# Patient Record
Sex: FEMALE | Race: WHITE
[De-identification: names, ages, dates, MRNs, and addresses within clinical notes are randomized per-mention and may not be internally consistent; named-entity substitution may affect disease eponyms.]

---

## 2022-09-29 ENCOUNTER — HOSPITAL ENCOUNTER (EMERGENCY)
Dept: HOSPITAL 5 - ED | Age: 82
Discharge: NURSING, REHAB OR LONG-TERM CARE | End: 2022-09-29
Payer: MEDICARE

## 2022-09-29 VITALS — DIASTOLIC BLOOD PRESSURE: 75 MMHG | SYSTOLIC BLOOD PRESSURE: 151 MMHG
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
## 2022-09-29 DIAGNOSIS — S72.92XA: Primary | ICD-10-CM
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
## 2022-09-29 LAB
ALBUMIN SERPL-MCNC: 4.1 G/DL (ref 3.9–5)
ALT SERPL-CCNC: 24 UNITS/L (ref 7–56)
BASOPHILS # (AUTO): 0 K/MM3 (ref 0–0.1)
BASOPHILS NFR BLD AUTO: 0.4 % (ref 0–1.8)
BUN SERPL-MCNC: 17 MG/DL (ref 7–17)
BUN/CREAT SERPL: 28 %
CALCIUM SERPL-MCNC: 9.4 MG/DL (ref 8.4–10.2)
EOSINOPHIL # BLD AUTO: 0.1 K/MM3 (ref 0–0.4)
EOSINOPHIL NFR BLD AUTO: 0.8 % (ref 0–4.3)
HCT VFR BLD CALC: 37.8 % (ref 30.3–42.9)
HEMOLYSIS INDEX: 5
HGB BLD-MCNC: 12.2 GM/DL (ref 10.1–14.3)
LYMPHOCYTES # BLD AUTO: 2.8 K/MM3 (ref 1.2–5.4)
LYMPHOCYTES NFR BLD AUTO: 37 % (ref 13.4–35)
MCHC RBC AUTO-ENTMCNC: 32 % (ref 30–34)
MCV RBC AUTO: 94 FL (ref 79–97)
MONOCYTES # (AUTO): 0.6 K/MM3 (ref 0–0.8)
MONOCYTES % (AUTO): 8.4 % (ref 0–7.3)
PLATELET # BLD: 228 K/MM3 (ref 140–440)
RBC # BLD AUTO: 4.02 M/MM3 (ref 3.65–5.03)

## 2022-09-29 PROCEDURE — 99285 EMERGENCY DEPT VISIT HI MDM: CPT

## 2022-09-29 PROCEDURE — 85025 COMPLETE CBC W/AUTO DIFF WBC: CPT

## 2022-09-29 PROCEDURE — 72125 CT NECK SPINE W/O DYE: CPT

## 2022-09-29 PROCEDURE — 71045 X-RAY EXAM CHEST 1 VIEW: CPT

## 2022-09-29 PROCEDURE — 80053 COMPREHEN METABOLIC PANEL: CPT

## 2022-09-29 PROCEDURE — 36415 COLL VENOUS BLD VENIPUNCTURE: CPT

## 2022-09-29 PROCEDURE — 72192 CT PELVIS W/O DYE: CPT

## 2022-09-29 PROCEDURE — 70450 CT HEAD/BRAIN W/O DYE: CPT

## 2022-09-29 NOTE — XRAY REPORT
CHEST 1 VIEW 9/29/2022 12:04 PM



INDICATION / CLINICAL INFORMATION: fall.



COMPARISON: 9/8/2016



FINDINGS:



SUPPORT DEVICES: None.

HEART / MEDIASTINUM: No significant abnormality. 

LUNGS / PLEURA: No significant pulmonary or pleural abnormality. No pneumothorax. 



ADDITIONAL FINDINGS: No significant additional findings.



IMPRESSION:

1. No acute findings.



Signer Name: Johan Nash MD 

Signed: 9/29/2022 1:07 PM

Workstation Name: [id number]

## 2022-09-29 NOTE — CAT SCAN REPORT
CT CERVICAL SPINE SPINE WITHOUT CONTRAST



INDICATION:  Fall, injury. 



TECHNIQUE:  Axial imaging performed through the cervical spine without the use of contrast.  Sagittal
 and coronal reconstructed images were also reviewed.  All CT scans at this location are performed us
ing CT dose reduction for ALARA by means of automated exposure control. 



COMPARISON:  None



FINDINGS: 



Alignment:  There is straightening of the normal cervical lordosis. No evidence for subluxation.  

Bones:  There is no acute osseous abnormality.  Mild to moderate discogenic DJD and facet arthropathy
 are identified at all levels.  No central canal stenosis or high-grade neural foraminal narrowing is
 detected. 

Soft tissues:  No acute or significant incidental soft tissue abnormality.



IMPRESSION:  No evidence for acute injury of the cervical spine. Multilevel degenerative changes thro
ughout the cervical spine.



Signer Name: Emanuel Yee Jr, MD 

Signed: 9/29/2022 12:52 PM

Workstation Name: [id number]

## 2022-09-29 NOTE — CAT SCAN REPORT
CT pelvis wo con 



INDICATION / CLINICAL INFORMATION:

Suspected femur fracture.



TECHNIQUE:

CT images of the pelvis without contrast All CT scans at this location are performed using CT dose re
duction for ALARA by means of automated exposure control.



COMPARISON:

None available.



FINDINGS:



There is an acute impacted subcapital left femoral neck fracture with mild displacement. No hip dislo
cation. Additionally, there is an acute fracture extending through the S3 vertebral body and posterio
r elements. No significant displacement. No additional fracture. Mild-moderate hip osteoarthritis wit
hout joint effusion.



Uterus is absent. Moderate stool distends the rectal vault. No acute findings in the pelvis.



IMPRESSION:

1. Acute impacted subcapital left femoral neck fracture.

2. Acute fracture of the S3 vertebral body and extending to involve the posterior elements without si
gnificant displacement



Signer Name: Chirag Urban MD 

Signed: 9/29/2022 12:58 PM

Workstation Name: [id number]

## 2022-09-29 NOTE — EMERGENCY DEPARTMENT REPORT
ED Fall HPI





- General


Chief Complaint: Extremity Injury, Lower


Stated Complaint: LT HIP PAIN x2DAYS


Time Seen by Provider: 09/29/22 11:08


Source: EMS


Mode of arrival: Stretcher





- History of Present Illness


Initial Comments: 





Patient is an 82-year-old female with history of dementia who presents to the 

emergency department with questional femur fracture.  Patient presents from [facility] with a report that since that she has a questionable femur fracture and 

that a repeat x-ray or CT scan is recommended.  Patient has no complaints at 

this time.





- Related Data


                                Home Medications











 Medication  Instructions  Recorded  Confirmed  Last Taken


 


Acetaminophen [Tylenol] 650 mg PO Q6HR PRN 09/08/16 09/29/22 Unknown


 


Antacid [Alum-Mag Hydrox-Simeth 10 ml PO Q4HR 09/08/16 09/29/22 Unknown





462-100-62Qp/5Ml Susp]    


 


Calcium Carbonate/Vitamin D3 1 each PO QDAY 09/08/16 09/29/22 Unknown





[Calcium 600-Vit D3 400 Tablet]    


 


Clopidogrel [Plavix] 75 mg PO QDAY 09/08/16 09/29/22 Unknown


 


Docusate Sodium [Colace] 100 mg PO 1XW PRN 09/08/16 09/29/22 Unknown


 


Donepezil HCl 10 mg PO QHS 09/08/16 09/29/22 Unknown


 


Isosorbide Mononitrate 30 mg PO QDAY 09/08/16 09/29/22 Unknown


 


Metoprolol Succinate [Metoprolol 25 mg PO QDAY 09/08/16 09/29/22 Unknown





SUCCINATE ER TAB]    


 


Multivitamin No.44/Vit D3/K 1 each PO QDAY 09/08/16 09/29/22 Unknown





[Multivitamins Softgels]    


 


Melatonin [Melatonin 5MG CAP] 5 mg PO QHS 09/29/22 09/29/22 Unknown


 


Memantine HCl 10 mg PO BID 09/29/22 09/29/22 Unknown


 


Polyethylene Glycol 3350 [Miralax] 17 gm PO DAILY 09/29/22 09/29/22 Unknown











                                    Allergies











Allergy/AdvReac Type Severity Reaction Status Date / Time


 


No Known Allergies Allergy   Verified 09/08/16 12:12














ED Review of Systems


ROS: 


Stated complaint: LT HIP PAIN x2DAYS


Other details as noted in HPI





Comment: Unobtainable due to pts medical conditions





ED Past Medical Hx





- Past Medical History


Hx Hypertension: Yes


Hx GERD: Yes


Hx Arthritis: Yes


Hx Dementia: Yes


Additional medical history: CAD





- Social History


Smoking Status: Unknown if ever smoked





- Medications


Home Medications: 


                                Home Medications











 Medication  Instructions  Recorded  Confirmed  Last Taken  Type


 


Acetaminophen [Tylenol] 650 mg PO Q6HR PRN 09/08/16 09/29/22 Unknown History


 


Antacid [Alum-Mag Hydrox-Simeth 10 ml PO Q4HR 09/08/16 09/29/22 Unknown History





[id number]/5Ml Susp]     


 


Calcium Carbonate/Vitamin D3 1 each PO QDAY 09/08/16 09/29/22 Unknown History





[Calcium 600-Vit D3 400 Tablet]     


 


Clopidogrel [Plavix] 75 mg PO QDAY 09/08/16 09/29/22 Unknown History


 


Docusate Sodium [Colace] 100 mg PO 1XW PRN 09/08/16 09/29/22 Unknown History


 


Donepezil HCl 10 mg PO QHS 09/08/16 09/29/22 Unknown History


 


Isosorbide Mononitrate 30 mg PO QDAY 09/08/16 09/29/22 Unknown History


 


Metoprolol Succinate [Metoprolol 25 mg PO QDAY 09/08/16 09/29/22 Unknown History





SUCCINATE ER TAB]     


 


Multivitamin No.44/Vit D3/K 1 each PO QDAY 09/08/16 09/29/22 Unknown History





[Multivitamins Softgels]     


 


Melatonin [Melatonin 5MG CAP] 5 mg PO QHS 09/29/22 09/29/22 Unknown History


 


Memantine HCl 10 mg PO BID 09/29/22 09/29/22 Unknown History


 


Polyethylene Glycol 3350 [Miralax] 17 gm PO DAILY 09/29/22 09/29/22 Unknown 

History














ED Physical Exam





- General


Limitations: Altered Mental Status, Physical Limitation


General appearance: alert, in no apparent distress





- Head


Head exam: Present: atraumatic, normocephalic





- Eye


Eye exam: Present: normal appearance





- ENT


ENT exam: Present: mucous membranes moist





- Neck


Neck exam: Present: normal inspection





- Respiratory


Respiratory exam: Present: normal lung sounds bilaterally.  Absent: respiratory 

distress





- Cardiovascular


Cardiovascular Exam: Present: regular rate, normal rhythm.  Absent: systolic 

murmur, diastolic murmur, rubs, gallop





- GI/Abdominal


GI/Abdominal exam: Present: soft, normal bowel sounds





- Rectal


Rectal exam: Present: deferred





- Extremities Exam


Extremities exam: Present: normal inspection





- Back Exam


Back exam: Present: normal inspection





- Neurological Exam


Neurological exam: Present: alert, oriented X3





- Psychiatric


Psychiatric exam: Present: normal affect, normal mood





- Skin


Skin exam: Present: warm, dry, intact, normal color.  Absent: rash





ED Course


                                   Vital Signs











  09/29/22 09/29/22 09/29/22





  10:51 11:40 15:17


 


Temperature 97.8 F  98.6 F


 


Pulse Rate 71  77


 


Respiratory 16  20





Rate   


 


Blood Pressure 130/89  151/75





[Left]   


 


O2 Sat by Pulse 98 98 98





Oximetry   














- Reevaluation(s)


Reevaluation #1: 





09/29/22 14:00


Patient has been accepted for transfer and acceptance to [facility]

 care of Dr. Chisholm.





ED Medical Decision Making





- Lab Data


Result diagrams: 


                                 09/29/22 11:52





                                 09/29/22 11:52





- Radiology Data


Radiology results: report reviewed, image reviewed





- Medical Decision Making





The patient is an 82-year-old female history of Alzheimer's dementia who 

presents to the emergency department with concern for a femur fracture.  Plan 

for CT imaging to further evaluate.  Given is unclear as to when patient fell we

 will also obtain a CT of the head and C-spine.  Chest x-ray is also ordered as 

well as basic labs.


Critical care attestation.: 


If time is entered above; I have spent that time in minutes in the direct care 

of this critically ill patient, excluding procedure time.








ED Disposition


Clinical Impression: 


 Femur fracture, Back fracture





Disposition: [facility]


Is pt being admited?: No


Does the pt Need Aspirin: No


Condition: Stable

## 2022-09-29 NOTE — CAT SCAN REPORT
CT HEAD WITHOUT CONTRAST



INDICATION / CLINICAL INFORMATION:  fall. Head injury



TECHNIQUE: Axial imaging performed from the skull apex through the skull base without the use of cont
rast.  Sagittal and coronal reformatted images.  All CT scans at this location are performed using CT
 dose reduction for ALARA by means of automated exposure control. 



COMPARISON: None available



FINDINGS:



CEREBRAL PARENCHYMA: No acute parenchymal abnormality is detected. Moderate volume loss and moderate 
chronic microangiopathy in the white matter are unchanged. 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
 posterior watershed region is also noted. 

HEMORRHAGE: None.

EXTRA-AXIAL SPACES: Normal in size and morphology for the patient's age.

VENTRICULAR SYSTEM: The ventricles remain mildly prominent likely representing ex vacuo dilatation fr
om volume loss.

MIDLINE SHIFT OR HERNIATION: None.



CEREBELLUM / BRAINSTEM: No significant abnormality.



CALVARIUM: No significant abnormality.

ORBITS: Normal as visualized.

PARANASAL SINUSES / MASTOID AIR CELLS: Normal as visualized.

SOFT TISSUES of HEAD: No significant abnormality.



ADDITIONAL FINDINGS: None.



IMPRESSION:

No acute intracranial abnormality. 

Moderate volume loss and chronic white matter changes.

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



Signer Name: Emanuel Yee Jr, MD 

Signed: 9/29/2022 12:50 PM

Workstation Name: [id number]